# Patient Record
Sex: MALE | Race: WHITE
[De-identification: names, ages, dates, MRNs, and addresses within clinical notes are randomized per-mention and may not be internally consistent; named-entity substitution may affect disease eponyms.]

---

## 2019-09-20 ENCOUNTER — HOSPITAL ENCOUNTER (OUTPATIENT)
Dept: HOSPITAL 74 - JASU-SURG | Age: 54
Discharge: HOME | End: 2019-09-20
Attending: ORTHOPAEDIC SURGERY
Payer: COMMERCIAL

## 2019-09-20 VITALS — BODY MASS INDEX: 23.6 KG/M2

## 2019-09-20 VITALS — SYSTOLIC BLOOD PRESSURE: 118 MMHG | HEART RATE: 61 BPM | TEMPERATURE: 98 F | DIASTOLIC BLOOD PRESSURE: 59 MMHG

## 2019-09-20 DIAGNOSIS — S83.242A: Primary | ICD-10-CM
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
## 2019-09-20 PROCEDURE — 0SBD4ZZ EXCISION OF LEFT KNEE JOINT, PERCUTANEOUS ENDOSCOPIC APPROACH: ICD-10-PCS | Performed by: ORTHOPAEDIC SURGERY

## 2019-09-20 NOTE — HP
Satellite PMH





- Chief Complaint


Chief Complaint: left knee pain





- Past Medical History


Allergies/Adverse Reactions: 


 Allergies











Allergy/AdvReac Type Severity Reaction Status Date / Time


 


Penicillins Allergy Intermediate Rash Verified 09/18/19 17:09


 


tree nut Allergy Intermediate Vomiting Verified 09/18/19 17:10














- Current Medications


Current Medications: 


 Home Medications











 Medication  Instructions  Recorded


 


Carbamazepine [Tegretol -] 200 mg PO HS 09/18/19


 


Oxycodone HCl/Acetaminophen 1 tab PO Q6H #20 tablet MDD 4 09/20/19





[Percocet 5-325 mg Tablet]  














Satellite Physical Exam





- Physical Examination


General Appearance: Well Nourished, Well Developed, Alert & Oriented x3


ENT: Clear


Lung: Normal air movement


Heart: Regular rate & rhythm


Extremities: Other (left knee- + swelling, + ttp, dec rom, + mcmurrays, nvi, 

MRI + mt)


Neurological: Intact, Alert, Oriented





Satellite Impression/Plan





- Impression/Plan


Impression: left knee internal derangement


Operative Procedure: left knee arthroscopy


Date to be Performed: 09/20/19

## 2019-09-20 NOTE — OP
Operative Note





- Note:


Operative Date: 09/20/19


Pre-Operative Diagnosis: internal derangement left knee


Operation: arthroscopy left knee with partial medial meniscectomy and excision 

of plica


Post-Operative Diagnosis: Same as Pre-op


Surgeon: Salbador Dean


Anesthesia: General


Estimated Blood Loss (mls): 0


Operative Report Dictated: Yes

## 2019-09-20 NOTE — OP
DATE OF OPERATION:  09/20/2019

 

PREOPERATIVE DIAGNOSIS:  Internal derangement, left knee.

 

POSTOPERATIVE DIAGNOSIS:  Internal derangement, left knee.

 

PROCEDURE:  Arthroscopy, left knee, partial medial meniscectomy, and excision of

synovial plica.

 

SURGEON:  Salbador Dean MD 

 

ANESTHESIA:  LMA.

 

CLOSURES:  4-0 nylon.

 

COMPLICATIONS:  None.

 

CONDITION:  To recovery room in stable condition.

 

DESCRIPTION OF PROCEDURE:  Patient was taken to the operating room on September 20, 2019.  General anesthesia with LMA was administered by the anesthesiologist.  Left

lower extremity was prepped and draped in the usual sterile fashion.  The medial and

lateral infrapatellar portal sites were infiltrated with 1% Xylocaine with

epinephrine.  Both portals were made with 15 blade _____ blunt trocar.  The scope

with trocar was placed in the lateral infrapatellar portal and opened in

suprapatellar pouch.  The pouch was inflated with a cocktail of 10 mL 1% Xylocaine,

10 mL 0.5% Marcaine, and 20 mL of arthroscopic saline.  After allowing the anesthetic

to work inside the knee, the procedure was performed.  The pouch was visualized to be

clean.  The medial and lateral gutters were visualized to be clean.  Patella and

trochlea were visualized to be basically intact.  There was a large synovial

thickened plica that was draping over the medial femoral condyle, which was abrading

it during flexion and extension.  This was debrided using the shaver.  With valgus

stress on the knee, the medial compartment was entered.  The medial meniscus was

visualized and probed.  Found to have a tear of its posterior horn.  This was

debrided back to smooth and stable meniscal tissue using a meniscal biter and

arthroscopic shaver.  The medial femoral condyle had some mild changes and was left

intact as was the medial tibial plateau.  At 90 degrees, the ACL was visualized and

probed and found to be intact.  In the figure-4 position, lateral compartment was

entered.  Lateral meniscus was visualized and probed and found to be intact.  Lateral

femoral condyle was run and found to be intact as was the lateral tibial plateau. 

The knee was irrigated with copious amounts of irrigation.  Portals were closed using

4-0 nylon.  Prior to closure, 20 mL of 0.5% Marcaine was infused into the knee for

postoperative analgesia through the outflow portal.  Patient awakened from anesthesia

and transferred to recovery in stable condition.  No complications.  Estimated blood

loss negligible.

 

 

SALBADOR DEAN M.D.

 

[id number]

DD: 09/20/2019 10:51

DT: 09/20/2019 11:51

Job #:  [id number]

## 2023-09-03 ENCOUNTER — HOSPITAL ENCOUNTER (INPATIENT)
Dept: HOSPITAL 74 - JER | Age: 58
LOS: 3 days | Discharge: HOME | DRG: 863 | End: 2023-09-06
Attending: INTERNAL MEDICINE | Admitting: STUDENT IN AN ORGANIZED HEALTH CARE EDUCATION/TRAINING PROGRAM
Payer: COMMERCIAL

## 2023-09-03 VITALS — BODY MASS INDEX: 24.3 KG/M2

## 2023-09-03 DIAGNOSIS — M79.89: ICD-10-CM

## 2023-09-03 DIAGNOSIS — L03.312: ICD-10-CM

## 2023-09-03 DIAGNOSIS — F31.9: ICD-10-CM
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
## 2023-09-03 DIAGNOSIS — T81.40XA: Primary | ICD-10-CM

## 2023-09-03 PROCEDURE — A9579 GAD-BASE MR CONTRAST NOS,1ML: HCPCS

## 2023-09-03 PROCEDURE — G0378 HOSPITAL OBSERVATION PER HR: HCPCS

## 2023-09-04 VITALS — RESPIRATION RATE: 18 BRPM

## 2023-09-04 LAB
ALBUMIN SERPL-MCNC: 3.8 G/DL (ref 3.4–5)
ALP SERPL-CCNC: 136 U/L (ref 45–117)
ALT SERPL-CCNC: 41 U/L (ref 13–61)
ANION GAP SERPL CALC-SCNC: 5 MMOL/L (ref 8–16)
APTT BLD: 27.6 SECONDS (ref 25.2–36.5)
AST SERPL-CCNC: 24 U/L (ref 15–37)
BASOPHILS # BLD: 0.5 % (ref 0–2)
BILIRUB SERPL-MCNC: 0.4 MG/DL (ref 0.2–1)
BUN SERPL-MCNC: 18.8 MG/DL (ref 7–18)
CALCIUM SERPL-MCNC: 8.9 MG/DL (ref 8.5–10.1)
CHLORIDE SERPL-SCNC: 106 MMOL/L (ref 98–107)
CO2 SERPL-SCNC: 29 MMOL/L (ref 21–32)
CREAT SERPL-MCNC: 1.3 MG/DL (ref 0.55–1.3)
DEPRECATED RDW RBC AUTO: 13.6 % (ref 11.9–15.9)
EOSINOPHIL # BLD: 0.7 % (ref 0–4.5)
ERYTHROCYTE [SEDIMENTATION RATE] IN BLOOD BY WESTERGREN METHOD: 23 MM/HR (ref 0–20)
GLUCOSE SERPL-MCNC: 118 MG/DL (ref 74–106)
HCT VFR BLD CALC: 36 % (ref 35.4–49)
HGB BLD-MCNC: 12.5 GM/DL (ref 11.7–16.9)
INR BLD: 1.11 (ref 0.83–1.09)
LYMPHOCYTES # BLD: 15.8 % (ref 8–40)
MCH RBC QN AUTO: 30.6 PG (ref 25.7–33.7)
MCHC RBC AUTO-ENTMCNC: 34.6 G/DL (ref 32–35.9)
MCV RBC: 88.5 FL (ref 80–96)
MONOCYTES # BLD AUTO: 10 % (ref 3.8–10.2)
NEUTROPHILS # BLD: 73 % (ref 42.8–82.8)
PLATELET # BLD AUTO: 148 10^3/UL (ref 134–434)
PMV BLD: 8.1 FL (ref 7.5–11.1)
POTASSIUM SERPLBLD-SCNC: 4.2 MMOL/L (ref 3.5–5.1)
PROT SERPL-MCNC: 7.4 G/DL (ref 6.4–8.2)
PT PNL PPP: 12.9 SEC (ref 9.7–13)
RBC # BLD AUTO: 4.07 M/MM3 (ref 4–5.6)
SODIUM SERPL-SCNC: 140 MMOL/L (ref 136–145)
WBC # BLD AUTO: 8.4 K/MM3 (ref 4–10)

## 2023-09-04 RX ADMIN — CEFEPIME HYDROCHLORIDE SCH MLS/HR: 2 INJECTION, POWDER, FOR SOLUTION INTRAVENOUS at 21:33

## 2023-09-04 RX ADMIN — CEFEPIME HYDROCHLORIDE SCH MLS/HR: 2 INJECTION, POWDER, FOR SOLUTION INTRAVENOUS at 12:08

## 2023-09-05 LAB
ANION GAP SERPL CALC-SCNC: 6 MMOL/L (ref 8–16)
BASOPHILS # BLD: 0.3 % (ref 0–2)
BUN SERPL-MCNC: 12.8 MG/DL (ref 7–18)
CALCIUM SERPL-MCNC: 8.7 MG/DL (ref 8.5–10.1)
CHLORIDE SERPL-SCNC: 106 MMOL/L (ref 98–107)
CO2 SERPL-SCNC: 28 MMOL/L (ref 21–32)
CREAT SERPL-MCNC: 1.2 MG/DL (ref 0.55–1.3)
DEPRECATED RDW RBC AUTO: 13.4 % (ref 11.9–15.9)
EOSINOPHIL # BLD: 0.7 % (ref 0–4.5)
GLUCOSE SERPL-MCNC: 105 MG/DL (ref 74–106)
HCT VFR BLD CALC: 39.9 % (ref 35.4–49)
HGB BLD-MCNC: 13.4 GM/DL (ref 11.7–16.9)
LYMPHOCYTES # BLD: 14.8 % (ref 8–40)
MCH RBC QN AUTO: 30.3 PG (ref 25.7–33.7)
MCHC RBC AUTO-ENTMCNC: 33.7 G/DL (ref 32–35.9)
MCV RBC: 89.9 FL (ref 80–96)
MONOCYTES # BLD AUTO: 11.1 % (ref 3.8–10.2)
NEUTROPHILS # BLD: 73.1 % (ref 42.8–82.8)
PLATELET # BLD AUTO: 165 10^3/UL (ref 134–434)
PMV BLD: 8.4 FL (ref 7.5–11.1)
POTASSIUM SERPLBLD-SCNC: 4.2 MMOL/L (ref 3.5–5.1)
RBC # BLD AUTO: 4.43 M/MM3 (ref 4–5.6)
SODIUM SERPL-SCNC: 140 MMOL/L (ref 136–145)
WBC # BLD AUTO: 9.4 K/MM3 (ref 4–10)

## 2023-09-05 RX ADMIN — CEFEPIME HYDROCHLORIDE SCH: 2 INJECTION, POWDER, FOR SOLUTION INTRAVENOUS at 19:42

## 2023-09-06 VITALS — TEMPERATURE: 98.6 F | DIASTOLIC BLOOD PRESSURE: 54 MMHG | SYSTOLIC BLOOD PRESSURE: 108 MMHG | HEART RATE: 74 BPM

## 2023-09-06 LAB
ALBUMIN SERPL-MCNC: 3.1 G/DL (ref 3.4–5)
ALP SERPL-CCNC: 111 U/L (ref 45–117)
ALT SERPL-CCNC: 37 U/L (ref 13–61)
ANION GAP SERPL CALC-SCNC: 6 MMOL/L (ref 8–16)
AST SERPL-CCNC: 20 U/L (ref 15–37)
BASOPHILS # BLD: 0.4 % (ref 0–2)
BILIRUB SERPL-MCNC: 0.2 MG/DL (ref 0.2–1)
BUN SERPL-MCNC: 14.5 MG/DL (ref 7–18)
CALCIUM SERPL-MCNC: 8.7 MG/DL (ref 8.5–10.1)
CHLORIDE SERPL-SCNC: 106 MMOL/L (ref 98–107)
CO2 SERPL-SCNC: 28 MMOL/L (ref 21–32)
CREAT SERPL-MCNC: 1.1 MG/DL (ref 0.55–1.3)
DEPRECATED RDW RBC AUTO: 13.4 % (ref 11.9–15.9)
EOSINOPHIL # BLD: 1.5 % (ref 0–4.5)
GLUCOSE SERPL-MCNC: 96 MG/DL (ref 74–106)
HCT VFR BLD CALC: 38.3 % (ref 35.4–49)
HGB BLD-MCNC: 13.5 GM/DL (ref 11.7–16.9)
LYMPHOCYTES # BLD: 23.8 % (ref 8–40)
MAGNESIUM SERPL-MCNC: 2.2 MG/DL (ref 1.8–2.4)
MCH RBC QN AUTO: 30.8 PG (ref 25.7–33.7)
MCHC RBC AUTO-ENTMCNC: 35.3 G/DL (ref 32–35.9)
MCV RBC: 87.4 FL (ref 80–96)
MONOCYTES # BLD AUTO: 11.7 % (ref 3.8–10.2)
NEUTROPHILS # BLD: 62.6 % (ref 42.8–82.8)
PHOSPHATE SERPL-MCNC: 3 MG/DL (ref 2.5–4.9)
PLATELET # BLD AUTO: 182 10^3/UL (ref 134–434)
PMV BLD: 8 FL (ref 7.5–11.1)
POTASSIUM SERPLBLD-SCNC: 4 MMOL/L (ref 3.5–5.1)
PROT SERPL-MCNC: 6.8 G/DL (ref 6.4–8.2)
RBC # BLD AUTO: 4.38 M/MM3 (ref 4–5.6)
SODIUM SERPL-SCNC: 140 MMOL/L (ref 136–145)
WBC # BLD AUTO: 7.2 K/MM3 (ref 4–10)